# Patient Record
Sex: MALE | Race: WHITE | NOT HISPANIC OR LATINO | Employment: FULL TIME | URBAN - METROPOLITAN AREA
[De-identification: names, ages, dates, MRNs, and addresses within clinical notes are randomized per-mention and may not be internally consistent; named-entity substitution may affect disease eponyms.]

---

## 2019-05-15 ENCOUNTER — HOSPITAL ENCOUNTER (EMERGENCY)
Facility: CLINIC | Age: 29
Discharge: HOME | End: 2019-05-15
Attending: FAMILY MEDICINE | Admitting: FAMILY MEDICINE
Payer: MEDICARE

## 2019-05-15 VITALS
TEMPERATURE: 98.3 F | WEIGHT: 220 LBS | SYSTOLIC BLOOD PRESSURE: 140 MMHG | BODY MASS INDEX: 26.79 KG/M2 | HEART RATE: 77 BPM | HEIGHT: 76 IN | DIASTOLIC BLOOD PRESSURE: 77 MMHG | RESPIRATION RATE: 14 BRPM | OXYGEN SATURATION: 98 %

## 2019-05-15 DIAGNOSIS — M54.6 ACUTE BILATERAL THORACIC BACK PAIN: ICD-10-CM

## 2019-05-15 PROCEDURE — 99283 EMERGENCY DEPT VISIT LOW MDM: CPT | Mod: Z6 | Performed by: FAMILY MEDICINE

## 2019-05-15 PROCEDURE — 99283 EMERGENCY DEPT VISIT LOW MDM: CPT

## 2019-05-15 RX ORDER — CYCLOBENZAPRINE HCL 10 MG
5-10 TABLET ORAL 3 TIMES DAILY PRN
Qty: 30 TABLET | Refills: 0 | Status: SHIPPED | OUTPATIENT
Start: 2019-05-15 | End: 2019-05-22

## 2019-05-15 ASSESSMENT — MIFFLIN-ST. JEOR: SCORE: 2069.41

## 2019-05-15 NOTE — ED AVS SNAPSHOT
[facility]  [address] [place] [address]  Phone:  [phone]  Fax:  [phone]                                    Quintin Frank   MRN: [id number]    Department:  [facility]   Date of Visit:  5/15/2019           After Visit Summary Signature Page    I have received my discharge instructions, and my questions have been answered. I have discussed any challenges I see with this plan with the nurse or doctor.    ..........................................................................................................................................  Patient/Patient Representative Signature      ..........................................................................................................................................  Patient Representative Print Name and Relationship to Patient    ..................................................               ................................................  Date                                   Time    ..........................................................................................................................................  Reviewed by Signature/Title    ...................................................              ..............................................  Date                                               Time          22EPIC Rev 08/18

## 2019-05-15 NOTE — DISCHARGE INSTRUCTIONS
Take ibuprofen up to 600 mg 4 times a day with food for 3-5 days.  Add Flexeril 5-10 mg up to 3 times a day as needed for pain and spasms.  Try to remain active as tolerated.  Use heat for the next several days.  Please see the attached handout.  Follow-up in 1 week if not improving.

## 2019-05-15 NOTE — ED PROVIDER NOTES
[facility] ED Provider Note   Patient: Quintin Frank  MRN #:  [id number]  Date of Visit: May 15, 2019    CC:     Chief Complaint   Patient presents with     Back Pain     HPI:  Quintin Frank is a 29 year old male who presented to the emergency department with a three day history of right sided back pain that radiates across his back. Patient woke up with this pain three days ago. He does not remember doing anything over the weekend that would have caused pain like this. He denies any new fall, injury, or trauma. The pain has worsened over the last few days. The patient denies any spinal tenderness, all of his pain is in his paraspinal muscles. He has increased pain when bending over or sitting up straight. He has pain when coughing or doing strenuous activities. The patient has a history of back pain, but reports that it has never felt like this before. He denies any weakness or numbness in his legs. He has no bowel or bladder incontinence. Patient has had the same mattress for a few years and says that it is neither hard nor soft.     Problem List:  Patient Active Problem List    Diagnosis Date Noted     Mild major depression (H) 05/26/2015     Priority: Medium     Pes planovalgus 04/25/2013     Priority: Medium     Plantar fascial fibromatosis 04/25/2013     Priority: Medium     Health Care Home 01/30/2013     Priority: Medium     EMERGENCY CARE PLAN  Presenting Problem Signs and Symptoms Treatment Plan    Questions or conerns during clinic hours    I will call the clinic directly     Questions or conerns outside clinic hours    I will call the 24 hour nurse line at [phone]    Patient needs to schedule an appointment    I will call the 24 hour scheduling team at [phone] or clinic directly    Same day treatment     I will call the clinic first, nurse line if after hours, urgent care and express care if needed                            DX  "V65.8 REPLACED WITH [id number] HEALTH CARE HOME (2013)       CARDIOVASCULAR SCREENING; LDL GOAL LESS THAN 130 2013     Priority: Medium     ADHD (attention deficit hyperactivity disorder) 2012     Priority: Medium     Anxiety 2012     Priority: Medium     Smoker 2012     Priority: Medium       Past Medical History:   Diagnosis Date     Anxiety      Attention deficit disorder with hyperactivity(314.01)      Bipolar disorder (H)      Low back pain      Mild major depression (H) 2015       MEDS:     ALPRAZolam (XANAX) 0.5 MG tablet   cyclobenzaprine (FLEXERIL) 10 MG tablet   escitalopram (LEXAPRO) 10 MG tablet   lidocaine (XYLOCAINE) 2 % solution (viscous)       ALLERGIES:  No Known Allergies    Past Surgical History:   Procedure Laterality Date     HC REMOVE TONSILS/ADENOIDS,<11 Y/O      T & A <13y.o.       Social History     Tobacco Use     Smoking status: Current Every Day Smoker     Packs/day: 0.50     Years: 8.00     Pack years: 4.00     Types: Cigarettes     Last attempt to quit: 2014     Years since quittin.3     Smokeless tobacco: Never Used   Substance Use Topics     Alcohol use: Yes     Alcohol/week: 0.0 oz     Comment: rare     Drug use: No         Review of Systems   Except as noted in HPI, all other systems were reviewed and are negative    Physical Exam     Vitals were reviewed  Patient Vitals for the past 8 hrs:   BP Temp Temp src Pulse Resp SpO2 Height Weight   05/15/19 1340 -- -- -- -- -- 98 % -- --   05/15/19 1329 140/77 98.3  F (36.8  C) Oral 77 14 97 % 1.93 m (6' 4\") 99.8 kg (220 lb)     GENERAL APPEARANCE: Alert, no apparent distress sitting  FACE: normal facies  EYES: Pupils are equal  HENT: normal external exam  NECK: no adenopathy or asymmetry  RESP: normal respiratory effort; clear breath sounds bilaterally  CV: regular rate and rhythm; no significant murmurs, gallops or rubs  ABD: soft, no tenderness; no rebound or guarding; bowel sounds are " normal  MS: Tenderness along the lower parathoracic and upper lumbar musculature.  No tenderness over the spinous processes in the midline.  Patient has pain with flexion to 25 degrees, extension to 15 degrees  EXT: No calf tenderness or pitting edema  SKIN: no worrisome rash; no vesicular lesions  NEURO: no facial droop; no focal deficits, speech is normal; normal strength in the lower extremity; deep tendon reflexes are brisk at the patella and Achilles tendon.  Strength is intact.      Available Lab/Imaging Results   No results found for this or any previous visit (from the past 24 hour(s)).             Impression     Final diagnoses:   Acute bilateral thoracic back pain         ED Course & Medical Decision Making   Quintin Frank is a 29 year old male who presented to the emergency department with 3-day history of back pain that is muscular.  Pain is worse with bending and extending.  He states the pain is worse when he is getting up and starting to move.  He woke up with the pain Monday and there was no preceding injury, trauma or illness.  Pain does not radiate anteriorly or into the groin.  Pain is bilateral.  Patient has tried over-the-counter cream.  On exam, patient has muscular tenderness, with pain that is exacerbated with flexion and extension.  He does not have any significant pain with rotation of the back and there is no midline tenderness.  He has no bowel or bladder symptoms, weakness, or paresthesias.  Patient will take ibuprofen, and add Flexeril as needed for pain and spasms.  Follow-up in the clinic in 1 week if not improving.           Written after-visit summary and instructions were given at the time of discharge.    Follow up Plan:   David Rhodes MD  [address] [place] [id number] [phone]    In 1 week  if not improving      Discharge Medications: Flexeril       This document serves as a record of services personally performed by Kaylah Emanuel MD. It was created on  their behalf by Ingrid Green, a trained medical scribe. The creation of this record is based on the provider's personal observations and the statements of the patient. This document has been checked and approved by the attending provider.    Disclaimer: This note consists of words and symbols derived from keyboarding and dictation using voice recognition software.  As a result, there may be errors that have gone undetected.  Please consider this when interpreting information found in this note.     Kaylah Emanuel MD  05/15/19 5387

## 2019-08-26 ENCOUNTER — HOSPITAL ENCOUNTER (EMERGENCY)
Facility: CLINIC | Age: 29
Discharge: HOME | End: 2019-08-26
Attending: FAMILY MEDICINE | Admitting: FAMILY MEDICINE
Payer: MEDICARE

## 2019-08-26 VITALS
DIASTOLIC BLOOD PRESSURE: 78 MMHG | RESPIRATION RATE: 20 BRPM | OXYGEN SATURATION: 95 % | SYSTOLIC BLOOD PRESSURE: 133 MMHG | TEMPERATURE: 97.2 F

## 2019-08-26 DIAGNOSIS — L55.9 BURN FROM THE SUN: ICD-10-CM

## 2019-08-26 PROCEDURE — 99284 EMERGENCY DEPT VISIT MOD MDM: CPT | Mod: Z6 | Performed by: FAMILY MEDICINE

## 2019-08-26 PROCEDURE — 99283 EMERGENCY DEPT VISIT LOW MDM: CPT | Performed by: FAMILY MEDICINE

## 2019-08-26 RX ORDER — CETIRIZINE HYDROCHLORIDE 10 MG/1
TABLET ORAL
Qty: 20 TABLET | Refills: 1 | Status: SHIPPED | OUTPATIENT
Start: 2019-08-26

## 2019-08-26 RX ORDER — IBUPROFEN 200 MG
CAPSULE ORAL 4 TIMES DAILY
Qty: 28 G | Refills: 1 | Status: SHIPPED | OUTPATIENT
Start: 2019-08-26

## 2019-08-26 RX ORDER — MELOXICAM 15 MG/1
15 TABLET ORAL DAILY
Qty: 10 TABLET | Refills: 0 | Status: SHIPPED | OUTPATIENT
Start: 2019-08-26 | End: 2019-09-05

## 2019-08-26 NOTE — ED AVS SNAPSHOT
[facility]  [address] [place] [address]  Phone:  [phone]  Fax:  [phone]                                    Quintin Frank   MRN: [id number]    Department:  [facility]   Date of Visit:  8/26/2019           After Visit Summary Signature Page    I have received my discharge instructions, and my questions have been answered. I have discussed any challenges I see with this plan with the nurse or doctor.    ..........................................................................................................................................  Patient/Patient Representative Signature      ..........................................................................................................................................  Patient Representative Print Name and Relationship to Patient    ..................................................               ................................................  Date                                   Time    ..........................................................................................................................................  Reviewed by Signature/Title    ...................................................              ..............................................  Date                                               Time          22EPIC Rev 08/18

## 2019-08-27 ASSESSMENT — ENCOUNTER SYMPTOMS
FEVER: 0
CHILLS: 0
COLOR CHANGE: 1

## 2019-08-27 NOTE — ED NOTES
Pt was out side at [employer] on Saturday and got sunburn on his right shoulder that started to blister Saturday and cont to be uncomfortable.  Had tried Aloe a few times but this last time caused more itching than he thought it should.

## 2019-08-27 NOTE — ED TRIAGE NOTES
Pt reports getting sunburn to his shoulders on Saturday, has been applying aloe to area without improvement.

## 2019-08-27 NOTE — ED PROVIDER NOTES
History     Chief Complaint   Patient presents with     Sunburn     The history is provided by the patient.     Quintin Frank is a 29 year old male who presents to the emergency department for sunburn. Patient reports getting sunburnt on bilateral shoulders on 8/24/19, 2 days ago. He states it is very itchy. He has been using Aloe on it but that seemed to make it worse. No history of bad sunburns in the past. He has cleansed/showered since the burn. Up to date on tetanus immunization.    Allergies:  No Known Allergies    Problem List:    Patient Active Problem List    Diagnosis Date Noted     Mild major depression (H) 05/26/2015     Priority: Medium     Pes planovalgus 04/25/2013     Priority: Medium     Plantar fascial fibromatosis 04/25/2013     Priority: Medium     Health Care Home 01/30/2013     Priority: Medium     EMERGENCY CARE PLAN  Presenting Problem Signs and Symptoms Treatment Plan    Questions or conerns during clinic hours    I will call the clinic directly     Questions or conerns outside clinic hours    I will call the 24 hour nurse line at [phone]    Patient needs to schedule an appointment    I will call the 24 hour scheduling team at [phone] or clinic directly    Same day treatment     I will call the clinic first, nurse line if after hours, urgent care and express care if needed                            DX V65.8 REPLACED WITH [id number] HEALTH CARE HOME (04/08/2013)       CARDIOVASCULAR SCREENING; LDL GOAL LESS THAN 130 01/04/2013     Priority: Medium     ADHD (attention deficit hyperactivity disorder) 12/27/2012     Priority: Medium     Anxiety 12/27/2012     Priority: Medium     Smoker 12/27/2012     Priority: Medium        Past Medical History:    Past Medical History:   Diagnosis Date     Anxiety      Attention deficit disorder with hyperactivity(314.01)      Bipolar disorder (H)      Low back pain      Mild major depression (H) 5/26/2015       Past Surgical History:    Past Surgical  History:   Procedure Laterality Date     HC REMOVE TONSILS/ADENOIDS,<13 Y/O      T & A <13y.o.       Family History:    Family History   Problem Relation Age of Onset     Alcohol/Drug Father         alcohol     Anesthesia Reaction Mother         long time to leave system     Depression Mother      Heart Disease Paternal Grandmother      Hypertension Paternal Grandmother      Heart Disease Paternal Grandfather      Hypertension Paternal Grandfather      Breast Cancer Maternal Grandmother      Diabetes Sister         type 1     Diabetes Paternal Uncle        Social History:  Marital Status:  Single [1]  Social History     Tobacco Use     Smoking status: Current Every Day Smoker     Packs/day: 1.00     Years: 8.00     Pack years: 8.00     Types: Cigarettes     Last attempt to quit: 2014     Years since quittin.6     Smokeless tobacco: Never Used   Substance Use Topics     Alcohol use: Yes     Alcohol/week: 0.0 oz     Comment: rare     Drug use: No        Medications:      ALPRAZolam (XANAX) 0.5 MG tablet   escitalopram (LEXAPRO) 10 MG tablet   lidocaine (XYLOCAINE) 2 % solution (viscous)     ImmHx:      Review of Systems   Constitutional: Negative for chills and fever.   Skin: Positive for color change (right shoulder sun burn') and rash.        Sunburn to bilateral shoulders.   All other systems reviewed and are negative.      Physical Exam   BP: 133/78  Heart Rate: 93  Temp: 97.2  F (36.2  C)  Resp: 20  SpO2: 95 %      Physical Exam   Constitutional: He appears well-developed and well-nourished. He appears distressed (Mild).   Skin: Skin is intact. Burn (Sun burn - right upper shoulder area - see picture) noted.        Nursing note and vitals reviewed.              ED Course        Procedures               Critical Care time:  none                 Assessments & Plan (with Medical Decision Making)  Evidence for some burn with some blistering to the shoulder area.  No open skin wounds at this time.   Patient instructed at the risk of infection and possible cancer down the road.  We discussed the need to prevent additional burn to the area.  We talked about different options for treatment and have written prescriptions for the medications as listed below.  Patient instructed appropriate care of the wound.  To return for increase or worsening symptoms as directed on the handouts he was given.     I have reviewed the nursing notes.    I have reviewed the findings, diagnosis, plan and need for follow up with the patient.       Discharge Medication List as of 8/26/2019 10:08 PM      START taking these medications    Details   cetirizine (ZYRTEC) 10 MG tablet 1 tab up to twice a day for itch/rash, Disp-20 tablet, R-1, E-Prescribe      meloxicam (MOBIC) 15 MG tablet Take 1 tablet (15 mg) by mouth daily for 10 days TAKE WITH FOOD AS NEEDED FOR PAIN. WEAN OFF OF THE MEDICATIONS AS YOUR SYMPTOMS IMPROVE., Disp-10 tablet, R-0, E-Prescribe      Neomycin-Bacitracin-Polymyxin (TRIPLE ANTIBIOTIC) 3.5-400-5000 OINT ointment Apply topically 4 times dailyDisp-28 g, [id number]                  I verbally discussed the findings of the evaluation today in the ER. I have verbally discussed with Quintin the suggested treatment(s) as described in the discharge instructions and handouts. I have prescribed the above listed medications and instructed him on appropriate use of these medications.      I have verbally suggested he follow-up in his clinic or return to the ER for increased symptoms. See the follow-up recommendations documented  in the after visit summary in this visit's EPIC chart.      Final diagnoses:   Burn from the sun - right shoulder     This document serves as a record of services personally performed by Kevin Montanez DO. It was created on their behalf by Renu Garcia, a trained medical scribe. The creation of this record is based on the provider's personal observations and the statements of the patient. This document  has been checked and approved by the attending provider.    Note: Chart documentation done in part with Dragon Voice Recognition software. Although reviewed after completion, some word and grammatical errors may remain.    8/26/2019   [facility] EMERGENCY DEPARTMENT     Jass Montanez,   08/27/19 0215

## 2019-09-28 ENCOUNTER — HEALTH MAINTENANCE LETTER (OUTPATIENT)
Age: 29
End: 2019-09-28

## 2020-03-15 ENCOUNTER — HEALTH MAINTENANCE LETTER (OUTPATIENT)
Age: 30
End: 2020-03-15

## 2021-01-10 ENCOUNTER — HEALTH MAINTENANCE LETTER (OUTPATIENT)
Age: 31
End: 2021-01-10

## 2021-05-08 ENCOUNTER — HEALTH MAINTENANCE LETTER (OUTPATIENT)
Age: 31
End: 2021-05-08

## 2021-10-23 ENCOUNTER — HEALTH MAINTENANCE LETTER (OUTPATIENT)
Age: 31
End: 2021-10-23

## 2022-06-04 ENCOUNTER — HEALTH MAINTENANCE LETTER (OUTPATIENT)
Age: 32
End: 2022-06-04

## 2022-10-09 ENCOUNTER — HEALTH MAINTENANCE LETTER (OUTPATIENT)
Age: 32
End: 2022-10-09

## 2023-05-11 ENCOUNTER — TRANSCRIBE ORDERS (OUTPATIENT)
Dept: OTHER | Age: 33
End: 2023-05-11

## 2023-05-11 DIAGNOSIS — G89.29 CHRONIC BILATERAL LOW BACK PAIN WITH SCIATICA, SCIATICA LATERALITY UNSPECIFIED: Primary | ICD-10-CM

## 2023-05-11 DIAGNOSIS — M54.40 CHRONIC BILATERAL LOW BACK PAIN WITH SCIATICA, SCIATICA LATERALITY UNSPECIFIED: Primary | ICD-10-CM

## 2023-06-10 ENCOUNTER — HEALTH MAINTENANCE LETTER (OUTPATIENT)
Age: 33
End: 2023-06-10

## 2023-07-31 ENCOUNTER — HOSPITAL ENCOUNTER (EMERGENCY)
Facility: CLINIC | Age: 33
Discharge: HOME | End: 2023-07-31
Attending: PHYSICIAN ASSISTANT | Admitting: PHYSICIAN ASSISTANT
Payer: MEDICARE

## 2023-07-31 VITALS
SYSTOLIC BLOOD PRESSURE: 131 MMHG | HEIGHT: 76 IN | RESPIRATION RATE: 17 BRPM | DIASTOLIC BLOOD PRESSURE: 80 MMHG | HEART RATE: 71 BPM | BODY MASS INDEX: 30.44 KG/M2 | OXYGEN SATURATION: 98 % | WEIGHT: 250 LBS | TEMPERATURE: 98.2 F

## 2023-07-31 DIAGNOSIS — L55.9 SUNBURN: ICD-10-CM

## 2023-07-31 PROCEDURE — 99282 EMERGENCY DEPT VISIT SF MDM: CPT

## 2023-07-31 PROCEDURE — 99283 EMERGENCY DEPT VISIT LOW MDM: CPT | Performed by: PHYSICIAN ASSISTANT

## 2023-07-31 ASSESSMENT — ACTIVITIES OF DAILY LIVING (ADL): ADLS_ACUITY_SCORE: 33

## 2023-07-31 NOTE — ED TRIAGE NOTES
Sunburn from [place] a water park 3 days ago and has been applying aloe until today he noticed he had blisters. States sunburn itches

## 2023-07-31 NOTE — ED PROVIDER NOTES
History     Chief Complaint   Patient presents with    Sunburn     HPI  Quintin Frank is a 33 year old male who presents to the emergency department complaining of a sunburn.  3 days ago he was at a water park and got burned.  He has been applying aloe but today he noticed that there were some small blisters on his shoulder so he decided to come here.  He complains of itching.  No other acute concerns at this time.        Allergies:  No Known Allergies    Problem List:    Patient Active Problem List    Diagnosis Date Noted    Mild major depression (H) 05/26/2015     Priority: Medium    Pes planovalgus 04/25/2013     Priority: Medium    Plantar fascial fibromatosis 04/25/2013     Priority: Medium    Health Care Home 01/30/2013     Priority: Medium     EMERGENCY CARE PLAN  Presenting Problem Signs and Symptoms Treatment Plan    Questions or conerns during clinic hours    I will call the clinic directly     Questions or conerns outside clinic hours    I will call the 24 hour nurse line at [phone]    Patient needs to schedule an appointment    I will call the 24 hour scheduling team at [phone] or clinic directly    Same day treatment     I will call the clinic first, nurse line if after hours, urgent care and express care if needed                                DX V65.8 REPLACED WITH [id number] HEALTH CARE HOME (04/08/2013)      CARDIOVASCULAR SCREENING; LDL GOAL LESS THAN 130 01/04/2013     Priority: Medium    ADHD (attention deficit hyperactivity disorder) 12/27/2012     Priority: Medium    Anxiety 12/27/2012     Priority: Medium    Smoker 12/27/2012     Priority: Medium        Past Medical History:    Past Medical History:   Diagnosis Date    Anxiety     Attention deficit disorder with hyperactivity(314.01)     Bipolar disorder (H)     Low back pain     Mild major depression (H) 5/26/2015       Past Surgical History:    Past Surgical History:   Procedure Laterality Date    HC REMOVE TONSILS/ADENOIDS,<13 Y/O   "    T & A <13y.o.       Family History:    Family History   Problem Relation Age of Onset    Alcohol/Drug Father         alcohol    Anesthesia Reaction Mother         long time to leave system    Depression Mother     Heart Disease Paternal Grandmother     Hypertension Paternal Grandmother     Heart Disease Paternal Grandfather     Hypertension Paternal Grandfather     Breast Cancer Maternal Grandmother     Diabetes Sister         type 1    Diabetes Paternal Uncle        Social History:  Marital Status:  Single [1]  Social History     Tobacco Use    Smoking status: Every Day     Packs/day: 1.00     Years: 8.00     Pack years: 8.00     Types: Cigarettes     Last attempt to quit: 2014     Years since quittin.5    Smokeless tobacco: Never   Substance Use Topics    Alcohol use: Yes     Alcohol/week: 0.0 standard drinks of alcohol     Comment: rare    Drug use: No        Medications:    ALPRAZolam (XANAX) 0.5 MG tablet  cetirizine (ZYRTEC) 10 MG tablet  escitalopram (LEXAPRO) 10 MG tablet  lidocaine (XYLOCAINE) 2 % solution (viscous)  meloxicam (MOBIC) 15 MG tablet  Neomycin-Bacitracin-Polymyxin (TRIPLE ANTIBIOTIC) 3.5-400-5000 OINT ointment          Review of Systems   All other systems reviewed and are negative.        Physical Exam   BP: 131/80  Pulse: 71  Temp: 98.2  F (36.8  C)  Resp: 17  Height: 193 cm (6' 4\")  Weight: 113.4 kg (250 lb)  SpO2: 98 %      Physical Exam  Vitals and nursing note reviewed.   Constitutional:       General: He is not in acute distress.     Appearance: Normal appearance. He is not ill-appearing, toxic-appearing or diaphoretic.   HENT:      Head: Normocephalic and atraumatic.      Nose: Nose normal.   Eyes:      Extraocular Movements: Extraocular movements intact.      Conjunctiva/sclera: Conjunctivae normal.   Pulmonary:      Effort: Pulmonary effort is normal. No respiratory distress.   Musculoskeletal:      Cervical back: Neck supple.   Skin:     General: Skin is warm and " dry.      Comments: Bright erythema across bilateral shoulders and upper back.  No evidence of blistering.   Neurological:      General: No focal deficit present.      Mental Status: He is alert. Mental status is at baseline.   Psychiatric:         Mood and Affect: Mood normal.         Behavior: Behavior normal.           ED Course            Procedures        No results found for this or any previous visit (from the past 24 hour(s)).    Medications - No data to display      Assessments & Plan (with Medical Decision Making)  Quintin Thompson is a 33 year old male who presented to the ED complaining of a sunburn that is itchy and blistering.  He was afebrile on arrival.  Exam did demonstrate evidence of sunburn to the shoulders and upper back but no blistering today.  I discussed the typical duration of healing from sunburns and if blisters were to occur and slough he could apply antibiotic ointment but otherwise I encouraged him to continue with aloe with lidocaine or calamine lotion for the itching as needed.  Should heal over the next several days.  Return precautions provided.     I have reviewed the nursing notes.    I have reviewed the findings, diagnosis, plan and need for follow up with the patient.    New Prescriptions    No medications on file       Final diagnoses:   Sunburn     Note: Chart documentation done in part with Dragon Voice Recognition software. Although reviewed after completion, some word and grammatical errors may remain.     7/31/2023   [facility] EMERGENCY DEPT       Radha Ugarte PA-C  07/31/23 1910

## 2023-08-01 NOTE — DISCHARGE INSTRUCTIONS
You have a sunburn. Sometimes they can blister. If it does blister and the blisters break off you can apply antibiotic ointment.  For the itching you can try aloe vera with lidocaine or calamine lotion.  Try to wear sunscreen in the sun in the future to prevent this from reoccurring.  For any worsening concerns please return to the emergency department.

## 2023-10-08 ENCOUNTER — HOSPITAL ENCOUNTER (EMERGENCY)
Facility: CLINIC | Age: 33
Discharge: HOME | End: 2023-10-08
Attending: EMERGENCY MEDICINE | Admitting: EMERGENCY MEDICINE
Payer: MEDICARE

## 2023-10-08 VITALS
RESPIRATION RATE: 18 BRPM | HEIGHT: 76 IN | BODY MASS INDEX: 30.43 KG/M2 | OXYGEN SATURATION: 100 % | DIASTOLIC BLOOD PRESSURE: 87 MMHG | SYSTOLIC BLOOD PRESSURE: 150 MMHG | TEMPERATURE: 97.5 F | HEART RATE: 69 BPM

## 2023-10-08 DIAGNOSIS — I77.89 PECTORALIS MINOR SYNDROME (H): ICD-10-CM

## 2023-10-08 PROCEDURE — 99283 EMERGENCY DEPT VISIT LOW MDM: CPT | Performed by: EMERGENCY MEDICINE

## 2023-10-08 PROCEDURE — 99284 EMERGENCY DEPT VISIT MOD MDM: CPT | Performed by: EMERGENCY MEDICINE

## 2023-10-08 PROCEDURE — 250N000013 HC RX MED GY IP 250 OP 250 PS 637: Performed by: EMERGENCY MEDICINE

## 2023-10-08 RX ORDER — OXYCODONE HYDROCHLORIDE 5 MG/1
5 TABLET ORAL ONCE
Status: COMPLETED | OUTPATIENT
Start: 2023-10-08 | End: 2023-10-08

## 2023-10-08 RX ORDER — TIZANIDINE 2 MG/1
4 TABLET ORAL ONCE
Status: COMPLETED | OUTPATIENT
Start: 2023-10-08 | End: 2023-10-08

## 2023-10-08 RX ORDER — TIZANIDINE 2 MG/1
2 TABLET ORAL 3 TIMES DAILY
Qty: 21 TABLET | Refills: 0 | Status: SHIPPED | OUTPATIENT
Start: 2023-10-08 | End: 2023-10-15

## 2023-10-08 RX ORDER — IBUPROFEN 600 MG/1
600 TABLET, FILM COATED ORAL EVERY 6 HOURS PRN
COMMUNITY

## 2023-10-08 RX ORDER — ACETAMINOPHEN 500 MG
1000 TABLET ORAL EVERY 6 HOURS PRN
COMMUNITY

## 2023-10-08 RX ADMIN — TIZANIDINE 4 MG: 2 TABLET ORAL at 16:44

## 2023-10-08 RX ADMIN — OXYCODONE HYDROCHLORIDE 5 MG: 5 TABLET ORAL at 16:44

## 2023-10-08 ASSESSMENT — ACTIVITIES OF DAILY LIVING (ADL): ADLS_ACUITY_SCORE: 35

## 2023-10-08 NOTE — ED PROVIDER NOTES
History     Chief Complaint   Patient presents with    Shoulder Pain     HPI  Quintin Frank is a 33 year old male presenting to the emergency department with right chest wall pain and right arm numbness that has been present over the last 1 week.  Patient was seen in urgent care 6 days ago with same pain, x-ray performed which did not demonstrate any glenohumeral arthritis or joint space narrowing.  He believes he had increasing pain after shoulder adduction maneuvers.  Here he has palpable contraction and tenderness over right pectoralis minor insertion, this reproduces symptoms down his right upper extremity into digits 4 and 5.  His pain is much worse than it was 6 days ago, currently 7/10, associated with burning sensation down his upper extremity.  He denies any other known trauma, he does drive a motor bike but recently moved this to storage.    He does not have reproduction of symptoms with neck movements, and no other associated symptoms.    Allergies:  No Known Allergies    Problem List:    Patient Active Problem List    Diagnosis Date Noted    Mild major depression (H) 05/26/2015     Priority: Medium    Pes planovalgus 04/25/2013     Priority: Medium    Plantar fascial fibromatosis 04/25/2013     Priority: Medium    Health Care Home 01/30/2013     Priority: Medium     EMERGENCY CARE PLAN  Presenting Problem Signs and Symptoms Treatment Plan    Questions or conerns during clinic hours    I will call the clinic directly     Questions or conerns outside clinic hours    I will call the 24 hour nurse line at [phone]    Patient needs to schedule an appointment    I will call the 24 hour scheduling team at [phone] or clinic directly    Same day treatment     I will call the clinic first, nurse line if after hours, urgent care and express care if needed                                DX V65.8 REPLACED WITH [id number] [facility] (04/08/2013)      CARDIOVASCULAR SCREENING; LDL GOAL LESS THAN 130  2013     Priority: Medium    ADHD (attention deficit hyperactivity disorder) 2012     Priority: Medium    Anxiety 2012     Priority: Medium    Smoker 2012     Priority: Medium        Past Medical History:    Past Medical History:   Diagnosis Date    Anxiety     Attention deficit disorder with hyperactivity(314.01)     Bipolar disorder (H)     Low back pain     Mild major depression (H24) 2015       Past Surgical History:    Past Surgical History:   Procedure Laterality Date    HC REMOVE TONSILS/ADENOIDS,<12 Y/O      T & A <13y.o.       Family History:    Family History   Problem Relation Age of Onset    Alcohol/Drug Father         alcohol    Anesthesia Reaction Mother         long time to leave system    Depression Mother     Heart Disease Paternal Grandmother     Hypertension Paternal Grandmother     Heart Disease Paternal Grandfather     Hypertension Paternal Grandfather     Breast Cancer Maternal Grandmother     Diabetes Sister         type 1    Diabetes Paternal Uncle        Social History:  Marital Status:  Single [1]  Social History     Tobacco Use    Smoking status: Every Day     Packs/day: 1.00     Years: 8.00     Pack years: 8.00     Types: Cigarettes     Last attempt to quit: 2014     Years since quittin.7    Smokeless tobacco: Never   Substance Use Topics    Alcohol use: Yes     Alcohol/week: 0.0 standard drinks of alcohol     Comment: rare    Drug use: No        Medications:    acetaminophen (TYLENOL) 500 MG tablet  ibuprofen (ADVIL/MOTRIN) 600 MG tablet  tiZANidine (ZANAFLEX) 2 MG tablet  ALPRAZolam (XANAX) 0.5 MG tablet  cetirizine (ZYRTEC) 10 MG tablet  escitalopram (LEXAPRO) 10 MG tablet  lidocaine (XYLOCAINE) 2 % solution (viscous)  meloxicam (MOBIC) 15 MG tablet  Neomycin-Bacitracin-Polymyxin (TRIPLE ANTIBIOTIC) 3.5-400-5000 OINT ointment          Review of Systems   All other systems reviewed and are negative.      Physical Exam   BP: (!) 150/87  Pulse:  "69  Temp: 97.5  F (36.4  C)  Resp: 18  Height: 193 cm (6' 4\")  SpO2: 100 %      Physical Exam  Constitutional:       General: He is not in acute distress.     Appearance: Normal appearance. He is not toxic-appearing.   HENT:      Head: Atraumatic.   Eyes:      General: No scleral icterus.     Conjunctiva/sclera: Conjunctivae normal.   Cardiovascular:      Rate and Rhythm: Normal rate.      Heart sounds: Normal heart sounds.   Pulmonary:      Effort: Pulmonary effort is normal. No respiratory distress.      Breath sounds: Normal breath sounds.   Abdominal:      Palpations: Abdomen is soft.      Tenderness: There is no abdominal tenderness.   Musculoskeletal:         General: No deformity.      Cervical back: Neck supple.      Comments: Tenderness over right coracoid and right pectoralis minor, palpable contraction of this muscle.  Positive east maneuver, positive upper limb tension test on the right, negative Adson test on the right.    Negative Spurling maneuver, no CT or L-spine tenderness.  5-5  strength, wrist extension, wrist flexion, 5 out of 5 elbow flexion and extension, no subacromial tenderness or signs of shoulder impingement.   Skin:     General: Skin is warm.   Neurological:      Mental Status: He is alert.         ED Course        33 year old male presenting to the emergency department with right chest wall pain and right arm numbness that has been present over the last 1 week.  Patient was seen in urgent care 6 days ago with same pain, x-ray performed which did not demonstrate any glenohumeral arthritis or joint space narrowing.  He believes he had increasing pain after shoulder adduction maneuvers.  Here he has palpable contraction and tenderness over right pectoralis minor insertion, this reproduces symptoms down his right upper extremity into digits 4 and 5.  His pain is much worse than it was 6 days ago, currently 7/10, associated with burning sensation down his upper extremity.  He denies any " other known trauma, he does drive a motor bike but recently moved this to storage.    He does not have reproduction of symptoms with neck movements, and no other associated symptoms.    His exam is concerning for right pectoralis minor syndrome, with palpable contraction of this muscle, causing numbness in digits 4 and 5.  He does not exhibit signs or symptoms of cervical radiculopathy, with negative Spurling maneuvers.  His provocative thoracic outlet syndrome maneuvers are suggestive of neurogenic thoracic outlet syndrome.  We have provided exercises for this, given his acute tenderness and discomfort around this area he may use sling for the next few days intermittently, however caution regarding long-term use of sling.  We will prescribe muscle relaxant medication, and physical therapy.  Patient has appointment tomorrow with [facility] orthopedics for additional assessment.       Procedures         No results found for this or any previous visit (from the past 24 hour(s)).    Medications   oxyCODONE (ROXICODONE) tablet 5 mg (has no administration in time range)   tiZANidine (ZANAFLEX) tablet 4 mg (has no administration in time range)       Assessments & Plan (with Medical Decision Making)     I have reviewed the nursing notes.    I have reviewed the findings, diagnosis, plan and need for follow up with the patient.      New Prescriptions    TIZANIDINE (ZANAFLEX) 2 MG TABLET    Take 1 tablet (2 mg) by mouth 3 times daily for 7 days       Final diagnoses:   Pectoralis minor syndrome (H24)       10/8/2023   [facility] EMERGENCY DEPT       David Ruiz MD  10/08/23 1145

## 2023-10-08 NOTE — ED TRIAGE NOTES
Pt reports pain in right shoulder for the past week that is getting increasingly worse. Was seen in  Monday and had x-rays and was told everything looked good.      Triage Assessment       Row Name 10/08/23 2049       Triage Assessment (Adult)    Airway WDL WDL       Respiratory WDL    Respiratory WDL WDL       Skin Circulation/Temperature WDL    Skin Circulation/Temperature WDL WDL       Cardiac WDL    Cardiac WDL WDL       Peripheral/Neurovascular WDL    Peripheral Neurovascular WDL WDL       Cognitive/Neuro/Behavioral WDL    Cognitive/Neuro/Behavioral WDL WDL

## 2023-10-28 ENCOUNTER — HEALTH MAINTENANCE LETTER (OUTPATIENT)
Age: 33
End: 2023-10-28

## 2024-08-15 ENCOUNTER — HOSPITAL ENCOUNTER (EMERGENCY)
Facility: CLINIC | Age: 34
Discharge: HOME | End: 2024-08-15
Attending: FAMILY MEDICINE | Admitting: FAMILY MEDICINE
Payer: MEDICARE

## 2024-08-15 VITALS
TEMPERATURE: 98.2 F | OXYGEN SATURATION: 99 % | SYSTOLIC BLOOD PRESSURE: 116 MMHG | RESPIRATION RATE: 18 BRPM | HEIGHT: 76 IN | HEART RATE: 64 BPM | DIASTOLIC BLOOD PRESSURE: 78 MMHG | BODY MASS INDEX: 25.94 KG/M2 | WEIGHT: 213 LBS

## 2024-08-15 DIAGNOSIS — R51.9 SINUS HEADACHE: ICD-10-CM

## 2024-08-15 LAB — SARS-COV-2 RNA RESP QL NAA+PROBE: NEGATIVE

## 2024-08-15 PROCEDURE — 99283 EMERGENCY DEPT VISIT LOW MDM: CPT | Performed by: FAMILY MEDICINE

## 2024-08-15 PROCEDURE — 87635 SARS-COV-2 COVID-19 AMP PRB: CPT | Performed by: FAMILY MEDICINE

## 2024-08-15 RX ORDER — AMOXICILLIN 500 MG/1
500 CAPSULE ORAL 3 TIMES DAILY
Qty: 30 CAPSULE | Refills: 0 | Status: SHIPPED | OUTPATIENT
Start: 2024-08-15 | End: 2024-08-25

## 2024-08-15 RX ORDER — FLUTICASONE PROPIONATE 50 MCG
1 SPRAY, SUSPENSION (ML) NASAL DAILY
COMMUNITY
Start: 2024-08-15 | End: 2024-08-29

## 2024-08-15 RX ORDER — KETOROLAC TROMETHAMINE 30 MG/ML
60 INJECTION, SOLUTION INTRAMUSCULAR; INTRAVENOUS ONCE
Status: DISCONTINUED | OUTPATIENT
Start: 2024-08-15 | End: 2024-08-15

## 2024-08-15 ASSESSMENT — COLUMBIA-SUICIDE SEVERITY RATING SCALE - C-SSRS
2. HAVE YOU ACTUALLY HAD ANY THOUGHTS OF KILLING YOURSELF IN THE PAST MONTH?: NO
6. HAVE YOU EVER DONE ANYTHING, STARTED TO DO ANYTHING, OR PREPARED TO DO ANYTHING TO END YOUR LIFE?: NO
1. IN THE PAST MONTH, HAVE YOU WISHED YOU WERE DEAD OR WISHED YOU COULD GO TO SLEEP AND NOT WAKE UP?: NO

## 2024-08-15 ASSESSMENT — ACTIVITIES OF DAILY LIVING (ADL)
ADLS_ACUITY_SCORE: 35
ADLS_ACUITY_SCORE: 35

## 2024-08-15 NOTE — DISCHARGE INSTRUCTIONS
We are sorry about your severe headache.  Your COVID test was negative.  Use Flonase nasal spray, 1 spray in each nostril daily for 10-14 days.  Add Zyrtec 10 mg daily for 7 days.  Wait several days to see if your symptoms improve.  If not begin amoxicillin 500 mg 3 times a day for 10 days.  Follow-up with your primary care provider in 10 days if not improving.

## 2024-08-15 NOTE — ED TRIAGE NOTES
PT here with c/o Headache since 0530H yesterday. PT suspects he got sinus infection on Sunday. Took Ibuprofen and tylenol with some relief. PT states it got worse today. Rates it as 6/10 pain score.

## 2024-08-15 NOTE — ED PROVIDER NOTES
[facility] ED Provider Note   Patient: Quintin Frank  MRN #:  [id number]  Date of Visit: August 15, 2024    CC:     Chief Complaint   Patient presents with    Headache     HPI:  Quintin Frank is a 34 year old male who presented to the emergency department with severe sinus headache pain since Sunday, worsening yesterday to the point where he took his wife's migraine medication.  He was able to fall asleep but woke up few hours ago, with persistent headache pain.  Patient is not prone to headaches, and does not have any fever, chills, nasal drainage.  He has an appointment later today in the clinic but was told that he should come in if his pain started to worsen.  Patient has had COVID twice and one of the times that he had COVID had sinus headache pain.  There has not been any known sick exposure contacts.  Patient has had a continuous headache since 24 hours earlier.  He reports that the sinus symptoms have improved.  Patient reports that the headache pain is worse when he bends over.    Problem List:  Patient Active Problem List    Diagnosis Date Noted    Mild major depression (H) 05/26/2015     Priority: Medium    Pes planovalgus 04/25/2013     Priority: Medium    Plantar fascial fibromatosis 04/25/2013     Priority: Medium    CARDIOVASCULAR SCREENING; LDL GOAL LESS THAN 130 01/04/2013     Priority: Medium    ADHD (attention deficit hyperactivity disorder) 12/27/2012     Priority: Medium    Anxiety 12/27/2012     Priority: Medium    Smoker 12/27/2012     Priority: Medium       Past Medical History:   Diagnosis Date    Anxiety     Attention deficit disorder with hyperactivity(314.01)     Bipolar disorder (H)     Low back pain     Mild major depression (H24) 5/26/2015       MEDS: amoxicillin (AMOXIL) 500 MG capsule  fluticasone (FLONASE) 50 MCG/ACT nasal spray  acetaminophen (TYLENOL) 500 MG tablet  ALPRAZolam (XANAX) 0.5 MG tablet  cetirizine  "(ZYRTEC) 10 MG tablet  escitalopram (LEXAPRO) 10 MG tablet  ibuprofen (ADVIL/MOTRIN) 600 MG tablet  lidocaine (XYLOCAINE) 2 % solution (viscous)  meloxicam (MOBIC) 15 MG tablet  Neomycin-Bacitracin-Polymyxin (TRIPLE ANTIBIOTIC) 3.5-400-5000 OINT ointment        ALLERGIES:  No Known Allergies    Past Surgical History:   Procedure Laterality Date    HC REMOVE TONSILS/ADENOIDS,<11 Y/O  1994    T & A <13y.o.       Social History     Tobacco Use    Smoking status: Every Day     Current packs/day: 0.00     Average packs/day: 1 pack/day for 8.0 years (8.0 ttl pk-yrs)     Types: Cigarettes     Start date: 1/17/2006     Last attempt to quit: 1/17/2014     Years since quitting: 10.5    Smokeless tobacco: Never   Substance Use Topics    Alcohol use: Yes     Alcohol/week: 0.0 standard drinks of alcohol     Comment: rare    Drug use: No         Review of Systems   Except as noted in HPI, all other systems were reviewed and are negative    Physical Exam   Vitals were reviewed  Patient Vitals for the past 8 hrs:   BP Temp Temp src Pulse Resp SpO2 Height Weight   08/15/24 0402 116/78 98.2  F (36.8  C) Oral 64 18 99 % 1.93 m (6' 4\") 96.6 kg (213 lb)     GENERAL APPEARANCE: Alert and oriented x 3, no acute distress  FACE: normal facies: No tenderness  EYES: Pupils are equal  HENT: normal external exam  NECK: no adenopathy or asymmetry; no nuchal rigidity  RESP: normal respiratory effort; clear breath sounds bilaterally  CV: regular rate and rhythm; no significant murmurs, gallops or rubs  ABD: soft, no tenderness; no rebound or guarding; bowel sounds are normal  EXT: No calf tenderness or pitting edema  SKIN: no worrisome rash        Available Lab/Imaging Results     Results for orders placed or performed during the hospital encounter of 08/15/24 (from the past 24 hour(s))   Symptomatic COVID-19 Virus (Coronavirus) by PCR Nasopharyngeal    Specimen: Nasopharyngeal; Swab   Result Value Ref Range    SARS CoV2 PCR Negative Negative    " Narrative    Testing was performed using the Xpert Xpress SARS-CoV-2 Assay on the Cepheid Gene-Xpert Instrument Systems. Additional information about this Emergency Use Authorization (EUA) assay can be found via the Lab Guide. This test should be ordered for the detection of SARS-CoV-2 in individuals who meet SARS-CoV-2 clinical and/or epidemiological criteria as well as from individuals without symptoms or other reasons to suspect COVID-19. Test performance for asymptomatic patients has only been established in anterior nasal swab specimens. This test is for in vitro diagnostic use under the FDA EUA for laboratories certified under CLIA to perform high complexity testing. This test has not been FDA cleared or approved. A negative result does not rule out the presence of PCR inhibitors in the specimen or target RNA concentration below the limit of detection for the assay. The possibility of a false negative should be considered if the patient's recent exposure or clinical presentation suggests COVID-19. This test was validated by the [facility] and [facility]. This laboratory is certified under the Clinical Laboratory Improvement Amendments (CLIA) as qualified to perform high complexity laboratory testing.                Impression     Final diagnoses:   Sinus headache         ED Course & Medical Decision Making   Quintin Frank is a 34 year old male who presented to the emergency department with severe sinus headache pain that started Sunday, worsened yesterday and now has persistent headache that starts in the frontal aspect of the head radiates over the top of his head into the occipital region.  Headache is worse when he bends over.  Patient has been taking some Tylenol and ibuprofen.  He is not prone to headaches or sinus infections.  He does have seasonal and environmental allergies.    Vital signs reveal a temp of 98.2, blood pressure 116/78, heart rate of 64, respiration 18,  99% oxygen saturation.  Exam reveals normal head and neck exam.  Lungs are clear, heart sounds are normal, abdomen soft and nontender.    Patient's COVID PCR test is negative.  Patient's wife was concerned that he has a persistent headache now.  I offered medications from the intractable headache protocol but the patient does not like needles and does not want a shot.  He initially was going to except an IM shot of Toradol and a p.o. dose of Decadron.  He declined both medications.  He will continue with alternating Tylenol and ibuprofen for pain.  Begin a trial of Flonase nasal spray and Zyrtec.  A prescription for amoxicillin was sent to his pharmacy should he have any continued symptoms of sinus congestion or drainage.  See discharge instructions below.        Written after-visit summary and instructions were given at the time of discharge.    Follow up Plan:   Abdoul Pepe  [address] [place] [address] [address]  [facility] [address]  [phone]    In 10 days  if not improving          Discharge Instructions:   We are sorry about your severe headache.  Your COVID test was negative.  Use Flonase nasal spray, 1 spray in each nostril daily for 10-14 days.  Add Zyrtec 10 mg daily for 7 days.  Wait several days to see if your symptoms improve.  If not begin amoxicillin 500 mg 3 times a day for 10 days.  Follow-up with your primary care provider in 10 days if not improving.       Disclaimer: This note consists of words and symbols derived from keyboarding and dictation using voice recognition software.  As a result, there may be errors that have gone undetected.  Please consider this when interpreting information found in this note.       Kaylah Emanuel MD  08/15/24 0695

## 2025-07-26 ENCOUNTER — HEALTH MAINTENANCE LETTER (OUTPATIENT)
Age: 35
End: 2025-07-26